# Patient Record
Sex: MALE | Race: WHITE
[De-identification: names, ages, dates, MRNs, and addresses within clinical notes are randomized per-mention and may not be internally consistent; named-entity substitution may affect disease eponyms.]

---

## 2018-05-12 ENCOUNTER — HOSPITAL ENCOUNTER (EMERGENCY)
Dept: HOSPITAL 56 - MW.ED | Age: 33
Discharge: HOME | End: 2018-05-12
Payer: SELF-PAY

## 2018-05-12 DIAGNOSIS — Z88.1: ICD-10-CM

## 2018-05-12 DIAGNOSIS — Z88.8: ICD-10-CM

## 2018-05-12 DIAGNOSIS — R10.32: Primary | ICD-10-CM

## 2018-05-12 DIAGNOSIS — K92.1: ICD-10-CM

## 2018-05-12 DIAGNOSIS — Z88.2: ICD-10-CM

## 2018-05-12 DIAGNOSIS — Z88.0: ICD-10-CM

## 2018-05-12 LAB
CHLORIDE SERPL-SCNC: 104 MMOL/L (ref 98–107)
SODIUM SERPL-SCNC: 137 MMOL/L (ref 136–148)

## 2018-05-12 PROCEDURE — 85025 COMPLETE CBC W/AUTO DIFF WBC: CPT

## 2018-05-12 PROCEDURE — 81001 URINALYSIS AUTO W/SCOPE: CPT

## 2018-05-12 PROCEDURE — 80053 COMPREHEN METABOLIC PANEL: CPT

## 2018-05-12 PROCEDURE — 36415 COLL VENOUS BLD VENIPUNCTURE: CPT

## 2018-05-12 PROCEDURE — 99284 EMERGENCY DEPT VISIT MOD MDM: CPT

## 2018-05-12 PROCEDURE — 74177 CT ABD & PELVIS W/CONTRAST: CPT

## 2018-05-12 PROCEDURE — 83690 ASSAY OF LIPASE: CPT

## 2018-05-12 NOTE — EDM.PDOC
ED HPI GENERAL MEDICAL PROBLEM





- General


Chief Complaint: Gastrointestinal Problem


Stated Complaint: ABDOMINAL PAIN,FEVER


Time Seen by Provider: 05/12/18 09:20


Source of Information: Reports: Patient


History Limitations: Reports: No Limitations





- History of Present Illness


INITIAL COMMENTS - FREE TEXT/NARRATIVE: 





History of present illness:


[]Patient has had left sided left lower abdominal pain past week and today had 

one bright red bloody stool. He denies any fevers or chills nausea or vomiting 

he has not had this pain past denies having any trauma or constipation or 

diarrhea.





Review of systems: 


As per history of present illness and below otherwise all systems reviewed and 

negative.





Past medical history: 


As per history of present illness and as reviewed below otherwise 

noncontributory.





Surgical history: 


As per history of present illness and as reviewed below otherwise 

noncontributory.





Social history: 


No reported history of drug or alcohol abuse.





Family history: 


As per history of present illness and as reviewed below otherwise 

noncontributory.





Physical exam:


General: Well developed, well nourished in NAD


HEENT: Atraumatic, normocephalic, pupils reactive, negative for conjunctival 

pallor or scleral icterus, mucous membranes moist, throat clear, neck supple, 

nontender, trachea midline.


Lungs: Clear to auscultation, breath sounds equal bilaterally, chest nontender.


Heart: S1S2, regular, negative for clicks, rubs, or JVD.


Abdomen: Soft, nondistended, tender left lower quadrant no rebound or guarding. 

Negative for masses or hepatosplenomegaly. Negative for costovertebral 

tenderness.


Pelvis: Stable nontender.


Genitourinary: Deferred.


Rectal: Deferred.


Extremities: Atraumatic, negative for cords or calf pain. Neurovascular 

unremarkable.


Neuro: Awake, alert, oriented. Cranial nerves II through XII unremarkable. 

Cerebellum unremarkable. Motor and sensory unremarkable throughout. Exam 

nonfocal.





Diagnostics:


[]CBC normal with H/H 15/45, chemistry normal urine negative CT abdomen and 

pelvis shows one small diverticula in the colon but no signs of diverticulitis





Therapeutics:


[]IV hydrated patient declined pain meds





Impression: 


[]Abdominal pain





Plan:


[]Follow-up PMD return if symptoms worsen or change





Definitive disposition and diagnosis as appropriate pending reevaluation and 

review of above.


  ** Left Abdominal


Pain Score (Numeric/FACES): 7





- Related Data


 Allergies











Allergy/AdvReac Type Severity Reaction Status Date / Time


 


amoxicillin Allergy  Anaphylactic Verified 05/12/18 09:19





   Shock  


 


azithromycin [From Zithromax] Allergy  Anaphylactic Verified 05/12/18 09:19





   Shock  


 


Penicillins Allergy  Anaphylactic Verified 05/12/18 09:19





   Shock  


 


Sulfa (Sulfonamide Allergy  Anaphylactic Verified 05/12/18 09:19





Antibiotics)   Shock  


 


pain medications Allergy  Anaphylactic Uncoded 05/12/18 09:19





   Shock  











Home Meds: 


 Home Meds





. [No Known Home Meds]  05/12/18 [History]











ED ROS GENERAL





- Review of Systems


Review Of Systems: See Below (See history of present illness)





ED EXAM, GI/ABD





- Physical Exam


Exam: See Below (See history of present illness)





Course





- Vital Signs


Last Recorded V/S: 


 Last Vital Signs











Temp  97.7 F   05/12/18 10:57


 


Pulse  69   05/12/18 10:57


 


Resp  18   05/12/18 10:57


 


BP  116/72   05/12/18 10:57


 


Pulse Ox  98   05/12/18 10:57














- Orders/Labs/Meds


Orders: 


 Active Orders 24 hr











 Category Date Time Status


 


 Abdomen Pelvis w Cont [CT] Stat Exams  05/12/18 10:25 Taken


 


 UA W/MICROSCOPIC [URIN] Stat Lab  05/12/18 09:18 Ordered


 


 Sodium Chloride 0.9% [Saline Flush] Med  05/12/18 09:23 Active





 10 ml FLUSH ASDIRECTED PRN   


 


 Sodium Chloride 0.9% [Saline Flush] Med  05/12/18 09:23 Active





 2.5 ml FLUSH ASDIRECTED PRN   


 


 Saline Lock Insert [OM.PC] Stat Oth  05/12/18 09:23 Ordered








 Medication Orders





Sodium Chloride (Saline Flush)  10 ml FLUSH ASDIRECTED PRN


   PRN Reason: Keep Vein Open


   Last Admin: 05/12/18 09:33  Dose: 10 ml


Sodium Chloride (Saline Flush)  2.5 ml FLUSH ASDIRECTED PRN


   PRN Reason: Keep Vein Open


   Last Admin: 05/12/18 09:33  Dose: 2.5 ml








Labs: 


 Laboratory Tests











  05/12/18 05/12/18 05/12/18 Range/Units





  09:18 09:25 09:25 


 


WBC   9.86   (4.0-11.0)  K/uL


 


RBC   5.27   (4.50-5.90)  M/uL


 


Hgb   15.7   (13.0-17.0)  g/dL


 


Hct   45.5   (38.0-50.0)  %


 


MCV   86.3   (80.0-98.0)  fL


 


MCH   29.8   (27.0-32.0)  pg


 


MCHC   34.5   (31.0-37.0)  g/dL


 


RDW Std Deviation   40.1   (28.0-62.0)  fl


 


RDW Coeff of Chevy   13   (11.0-15.0)  %


 


Plt Count   255   (150-400)  K/uL


 


MPV   11.00   (7.40-12.00)  fL


 


Neut % (Auto)   71.4   (48.0-80.0)  %


 


Lymph % (Auto)   21.6   (16.0-40.0)  %


 


Mono % (Auto)   5.6   (0.0-15.0)  %


 


Eos % (Auto)   1.3   (0.0-7.0)  %


 


Baso % (Auto)   0.1   (0.0-1.5)  %


 


Neut # (Auto)   7.0 H   (1.4-5.7)  K/uL


 


Lymph # (Auto)   2.1   (0.6-2.4)  K/uL


 


Mono # (Auto)   0.6   (0.0-0.8)  K/uL


 


Eos # (Auto)   0.1   (0.0-0.7)  K/uL


 


Baso # (Auto)   0.0   (0.0-0.1)  K/uL


 


Nucleated RBC %   0.0   /100WBC


 


Nucleated RBCs #   0   K/uL


 


Sodium    137  (136-148)  mmol/L


 


Potassium    4.3  (3.5-5.1)  mmol/L


 


Chloride    104  ()  mmol/L


 


Carbon Dioxide    27.5  (21.0-32.0)  mmol/L


 


BUN    17  (7.0-18.0)  mg/dL


 


Creatinine    1.2  (0.8-1.3)  mg/dL


 


Est Cr Clr Drug Dosing    102.75  mL/min


 


Estimated GFR (MDRD)    > 60.0  ml/min


 


Glucose    85  ()  mg/dL


 


Calcium    9.5  (8.5-10.1)  mg/dL


 


Total Bilirubin    0.7  (0.2-1.0)  mg/dL


 


AST    45 H  (15-37)  IU/L


 


ALT    83 H  (14-63)  IU/L


 


Alkaline Phosphatase    116  ()  U/L


 


Total Protein    8.0  (6.4-8.2)  g/dL


 


Albumin    4.1  (3.4-5.0)  g/dL


 


Globulin    3.9 H  (2.0-3.5)  g/dL


 


Albumin/Globulin Ratio    1.1 L  (1.3-2.8)  


 


Lipase    127  ()  U/L


 


Urine Color  YELLOW    


 


Urine Appearance  CLEAR    


 


Urine pH  5.5    (5.0-8.0)  


 


Ur Specific Gravity  1.025    (1.001-1.035)  


 


Urine Protein  TRACE    (NEGATIVE)  mg/dL


 


Urine Glucose (UA)  NEGATIVE    (NEGATIVE)  mg/dL


 


Urine Ketones  NEGATIVE    (NEGATIVE)  mg/dL


 


Urine Occult Blood  NEGATIVE    (NEGATIVE)  


 


Urine Nitrite  NEGATIVE    (NEGATIVE)  


 


Urine Bilirubin  NEGATIVE    (NEGATIVE)  


 


Urine Urobilinogen  0.2    (<2.0)  EU/dL


 


Ur Leukocyte Esterase  NEGATIVE    (NEGATIVE)  


 


Urine RBC  NONE SEEN    (0-2/HPF)  


 


Urine WBC  0-1    (0-5/HPF)  


 


Ur Epithelial Cells  RARE    (NONE-FEW)  


 


Urine Bacteria  FEW    (NEGATIVE)  











Meds: 


Medications











Generic Name Dose Route Start Last Admin





  Trade Name Freq  PRN Reason Stop Dose Admin


 


Sodium Chloride  10 ml  05/12/18 09:23  05/12/18 09:33





  Saline Flush  FLUSH   10 ml





  ASDIRECTED PRN   Administration





  Keep Vein Open   





     





     





     


 


Sodium Chloride  2.5 ml  05/12/18 09:23  05/12/18 09:33





  Saline Flush  FLUSH   2.5 ml





  ASDIRECTED PRN   Administration





  Keep Vein Open   





     





     





     














Discontinued Medications














Generic Name Dose Route Start Last Admin





  Trade Name Freq  PRN Reason Stop Dose Admin


 


Iopamidol  100 ml  05/12/18 10:57  05/12/18 10:58





  Isovue-370 (76%)  IVPUSH  05/12/18 10:58  100 ml





  ONETIME STA   Administration





     





     





     





     














Departure





- Departure


Time of Disposition: 11:23


Disposition: Home, Self-Care 01


Condition: Good


Clinical Impression: 


 Left lower quadrant abdominal pain of unknown etiology








- Discharge Information


Referrals: 


PCP,None [Primary Care Provider] - 


Forms:  ED Department Discharge


Additional Instructions: 


The following information is given to patients seen in the emergency department 

who are being discharged to home. This information is to outline your options 

for follow-up care. We provide all patients seen in our emergency department 

with a follow-up referral.





The need for follow-up, as well as the timing and circumstances, are variable 

depending upon the specifics of your emergency department visit.





If you don't have a primary care physician on staff, we will provide you with a 

referral. We always advise you to contact your personal physician following an 

emergency department visit to inform them of the circumstance of the visit and 

for follow-up with them and/or the need for any referrals to a consulting 

specialist.





The emergency department will also refer you to a specialist when appropriate. 

This referral assures that you have the opportunity for follow-up care with a 

specialist. All of these measure are taken in an effort to provide you with 

optimal care, which includes your follow-up.





Under all circumstances we always encourage you to contact your private 

physician who remains a resource for coordinating your care. When calling for 

follow-up care, please make the office aware that this follow-up is from your 

recent emergency room visit. If for any reason you are refused follow-up, 

please contact the [facility] Emergency 

Department at [phone] and asked to speak to the emergency department 

charge nurse.





[facility]


Primary Care


[address] [place] [id number]


Phone: [phone]


Fax: [phone]





- My Orders


Last 24 Hours: 


My Active Orders





05/12/18 09:18


UA W/MICROSCOPIC [URIN] Stat 





05/12/18 09:23


Sodium Chloride 0.9% [Saline Flush]   10 ml FLUSH ASDIRECTED PRN 


Sodium Chloride 0.9% [Saline Flush]   2.5 ml FLUSH ASDIRECTED PRN 


Saline Lock Insert [OM.PC] Stat 





05/12/18 10:25


Abdomen Pelvis w Cont [CT] Stat 














- Assessment/Plan


Last 24 Hours: 


My Active Orders





05/12/18 09:18


UA W/MICROSCOPIC [URIN] Stat 





05/12/18 09:23


Sodium Chloride 0.9% [Saline Flush]   10 ml FLUSH ASDIRECTED PRN 


Sodium Chloride 0.9% [Saline Flush]   2.5 ml FLUSH ASDIRECTED PRN 


Saline Lock Insert [OM.PC] Stat 





05/12/18 10:25


Abdomen Pelvis w Cont [CT] Stat

## 2018-05-14 NOTE — CT
EXAM DATE: 18



PATIENT'S AGE: 33



Patient: ALONZO ALFRED



Facility: [place], [place]

Patient ID: [id number]

Site Patient ID: [id number].

Site Accession #: [id number]

: 1985

Study: CT Abdomen/Pelvis W CONT [id number]/12/2018 11:04:03 AM

Ordering Physician: Bebeto Duval



Final Report: 

INDICATION:

Left lower quadrant pain.



TECHNIQUE:

A CT volumetric acquisition was performed of the abdomen and pelvis during 
intravenous infusion of 100 cc of Isovue-370 nonionic intravenous contrast.



FINDINGS:

CT images demonstrate a normal appearance of the lung bases. There is no 
evidence of pleural or pericardial fluid. Within the abdomen the patient`s 
liver and spleen demonstrate normal size and uniform enhancement. There is an 
accessory splenule located at the medial inferior margin of the spleen. There 
is no evidence of inflammation or mass within the pancreas or stomach. The 
gallbladder and bile ducts are normal size. The adrenal glands have normal 
morphology. Kidneys show symmetric uniform enhancement. There is no evidence of 
calculus, mass or hydronephrosis. The abdominal aorta and iliac vessels appear 
normal. There is no evidence of retroperitoneal lymphadenopathy. The appendix 
appears normal and lies in a typical anatomic position within the right lower 
quadrant. There is no evidence of inflammation within the small intestine or 
small bowel mesentery. There are a few tiny diverticula noted within the left 
colon but I see no evidence of active inflammation. The prostate gland and 
urinary bladder appear normal. The ventral abdominal wall musculature appears 
intact.



IMPRESSION:

No acute process identified to account for the patient`s left lower quadrant 
pain.



Please note that all CT scans at this facility use dose modulation, iterative 
reconstruction, and/or weight-based dosing when appropriate to reduce radiation 
dose to as low as reasonably achievable.



Dictated by Tiago Whitney MD @ May 12 2018 11:12AM

(Electronic Signature)





Report Signed by Proxy.
MARCUS